# Patient Record
Sex: MALE | Race: WHITE
[De-identification: names, ages, dates, MRNs, and addresses within clinical notes are randomized per-mention and may not be internally consistent; named-entity substitution may affect disease eponyms.]

---

## 2017-01-30 ENCOUNTER — HOSPITAL ENCOUNTER (EMERGENCY)
Dept: HOSPITAL 47 - EC | Age: 57
Discharge: HOME | End: 2017-01-30
Payer: COMMERCIAL

## 2017-01-30 VITALS
SYSTOLIC BLOOD PRESSURE: 106 MMHG | HEART RATE: 72 BPM | TEMPERATURE: 99.2 F | RESPIRATION RATE: 16 BRPM | DIASTOLIC BLOOD PRESSURE: 69 MMHG

## 2017-01-30 DIAGNOSIS — R10.31: Primary | ICD-10-CM

## 2017-01-30 PROCEDURE — 99284 EMERGENCY DEPT VISIT MOD MDM: CPT

## 2017-01-30 NOTE — US
EXAMINATION TYPE: US groin extremity RT

 

DATE OF EXAM: 1/30/2017 6:14 PM

 

COMPARISON: No previous

 

CLINICAL HISTORY: Pain. Intermittent right groin pain x 2 weeks, gets worse when standing

 

TECHNOLOGIST IMPRESSION/RADIOGRAPHIC FINDINGS: Scanned right groin area of pain: 1.0 x 0.5 cm lymph n
ode seen containing a fatty hilum, otherwise appears wnl at this time, scanned left groin for compari
son: appears wnl at this time 

 

IMPRESSION:  Single nonenlarged right superficial inguinal lymph node containing a regular fatty hilu
m and no cortical thickening. No other sonographic abnormality.

## 2017-01-30 NOTE — ED
General Adult HPI





- General


Chief complaint: Urogenital


Stated complaint: rt groin pain


Time Seen by Provider: 01/30/17 17:09


Source: patient, RN notes reviewed


Mode of arrival: ambulatory


Limitations: no limitations





- History of Present Illness


Initial comments: 


This is a 48-year-old male presents with right-sided groin pain 2 weeks.  

Patient states he saw his regular physician for this pain and was treated with 

a steroid.  Patient states he's finished the steroid and the pain is still 

persistent.  Patient states the pain is worse when he is standing for multiple 

hours during the day at work.  Patient states the pain improves with sitting 

and resting.  Patient describes the pain as a burning in the right inguinal 

area.  Patient denies any swelling, erythema or abdominal pain.  Patient denies 

any nausea/vomiting/diarrhea, fever/chills.  Patient denies any heavy lifting 

at work.  Patient is not on any anticoagulants. Patient denies any recent fever

, chills, shortness breath, chest pain, back pain, numbness, tingling, hematuria

, headache, or visual changes, or any other complaints.








- Related Data


 Home Medications











 Medication  Instructions  Recorded  Confirmed


 


No Known Home Medications [No  01/30/17 01/30/17





Known Home Medications]   











 Allergies











Allergy/AdvReac Type Severity Reaction Status Date / Time


 


No Known Allergies Allergy   Verified 01/30/17 16:14














Review of Systems


ROS Statement: 


Those systems with pertinent positive or pertinent negative responses have been 

documented in the HPI.





ROS Other: All systems not noted in ROS Statement are negative.





Past Medical History


Past Medical History: Hyperlipidemia, Hypertension


Additional Past Medical History / Comment(s): HAIRLINE FRACTURES TO BILATERAL 

HIPS, SPINAL CORD PROBLEMS/ CHIP FX


History of Any Multi-Drug Resistant Organisms: None Reported


Past Surgical History: No Surgical Hx Reported


Past Psychological History: No Psychological Hx Reported


Smoking Status: Never smoker


Past Alcohol Use History: None Reported


Past Drug Use History: None Reported





General Exam





- General Exam Comments


Initial Comments: 


General:  The patient is awake and alert, in no distress, and does not appear 

acutely ill. 


Neck:  The neck is supple, there is no tenderness or JVD.  


Cardiovascular:  There is a regular rate and rhythm. No murmur, rub or gallop 

is appreciated.


Respiratory:  Lungs are clear to auscultation, respirations are non-labored, 

breath sounds are equal.  No wheezes, stridor, rales, or rhonchi.


Gastrointestinal:  Soft, non-distended, non-tender abdomen without masses or 

organomegaly noted. There is no rebound or guarding present.  No CVA 

tenderness. Bowel sounds are unremarkable.


Musculoskeletal:  Normal ROM, no tenderness.  Strength 5/5. Sensation intact. 

Radial pulses equal bilaterally 2+.  


Neurological:  A&O x 3. CN II-XII intact, There are no obvious motor or sensory 

deficits. Coordination appears grossly intact. Speech is normal.


Skin:  Skin is warm and dry and no rashes or lesions are noted. 


Psychiatric:  Cooperative, appropriate mood & affect, normal judgment.  





Limitations: no limitations


 exam: Present: normal inspection, other (Mild tenderness to palpation of the 

right inguinal area, no inguinal swelling or erythema. No hernia present on 

exam.).  Absent: testicular tenderness, urethral discharge, scrotal swelling





Course


 Vital Signs











  01/30/17 01/30/17





  16:08 19:21


 


Temperature 98.1 F 99.2 F


 


Pulse Rate 79 72


 


Respiratory 18 16





Rate  


 


Blood Pressure 126/89 106/69


 


O2 Sat by Pulse 98 96





Oximetry  














Medical Decision Making





- Medical Decision Making


This is a 56-year-old male who presents with right-sided groin pain.  On 

physical exam there is no evidence of a hernia, no inguinal swelling or 

erythema. Mild tenderness to palpation of the right inguinal area. No 

testicular pain. A right groin US was done and reviewed: Ultrasound showed 

lymph node approximately 1 cm but otherwise no abnormalities and no sign of 

hernia.  I discussed results with patient.  I discussed that this could be a 

muscle strain.  I discussed continue use of Tylenol and or Motrin.  I discussed 

that patient keep his follow-up appointment with his doctor tomorrow.  

Discussed ice and heat to the area.  I discussed return parameters. Discussed 

that patient should follow up with PCP in one to 2 days or return to the [facility] for 

any worsening symptoms or for any further concerns.  Patient was receptive to 

this plan and patient will be discharged home. 








Disposition


Clinical Impression: 


 Groin pain





Disposition: HOME SELF-CARE


Condition: Good


Instructions:  Groin Pain (ED)


Additional Instructions: 


Please continue Tylenol and/or Motrin if they've any pain.  Please rest ice and 

heat to the area.  Please continue follow-up with her primary care physician or 

return to the [facility] for any worsening symptoms or further concerns.


Referrals: 


Ghulam Esquivel MD [Primary Care Provider] - 1-2 days


Time of Disposition: 19:13

## 2019-07-30 ENCOUNTER — HOSPITAL ENCOUNTER (OUTPATIENT)
Dept: HOSPITAL 47 - RADCTMAIN | Age: 59
Discharge: HOME | End: 2019-07-30
Attending: FAMILY MEDICINE
Payer: COMMERCIAL

## 2019-07-30 DIAGNOSIS — S09.90XA: ICD-10-CM

## 2019-07-30 DIAGNOSIS — I67.82: Primary | ICD-10-CM

## 2019-07-30 DIAGNOSIS — Z91.018: ICD-10-CM

## 2019-07-30 DIAGNOSIS — R90.82: ICD-10-CM

## 2019-07-30 PROCEDURE — 70470 CT HEAD/BRAIN W/O & W/DYE: CPT

## 2019-07-30 NOTE — CT
EXAMINATION TYPE: CT brain wo/w con

 

DATE OF EXAM: 7/30/2019

 

COMPARISON: 9/5/2011

 

HISTORY: syncopal episode following head injury 3 days ago

 

CT DLP: 2108.4 mGycm

Automated exposure control for dose reduction was used.

 

CONTRAST: 

CT scan of the head is performed with IV Contrast, patient injected with 100 mL of Isovue 300.

 

FINDINGS: 

There is no abnormal enhancing mass or midline shift identified.  The ventricles and sulci are within
 normal limits in size. Low-attenuation the white matter is nonspecific but most typical remote micro
vascular ischemia. Intracranial atherosclerotic changes are noted.

 

IMPRESSION:

1. Nonspecific white matter changes most typical remote microvascular ischemia.

## 2020-02-01 ENCOUNTER — HOSPITAL ENCOUNTER (OUTPATIENT)
Dept: HOSPITAL 47 - LABWHC1 | Age: 60
Discharge: HOME | End: 2020-02-01
Attending: FAMILY MEDICINE
Payer: COMMERCIAL

## 2020-02-01 DIAGNOSIS — N18.3: Primary | ICD-10-CM

## 2020-02-01 PROCEDURE — 82575 CREATININE CLEARANCE TEST: CPT

## 2020-02-01 PROCEDURE — 81050 URINALYSIS VOLUME MEASURE: CPT

## 2020-02-01 PROCEDURE — 36415 COLL VENOUS BLD VENIPUNCTURE: CPT

## 2020-02-01 PROCEDURE — 84156 ASSAY OF PROTEIN URINE: CPT

## 2020-02-02 LAB
CREAT 24H UR-MCNC: 1736.4 MG/24HR (ref 1000–2000)
CREAT CL ?TM UR+SERPL-VRATE: 75 ML/MIN (ref 70–135)
SPECIMEN VOL 24H UR: 1200 MLS (ref 800–1800)

## 2020-02-03 ENCOUNTER — HOSPITAL ENCOUNTER (OUTPATIENT)
Dept: HOSPITAL 47 - RADUSWWP | Age: 60
Discharge: HOME | End: 2020-02-03
Attending: FAMILY MEDICINE
Payer: COMMERCIAL

## 2020-02-03 DIAGNOSIS — Z91.018: ICD-10-CM

## 2020-02-03 DIAGNOSIS — N18.3: Primary | ICD-10-CM

## 2020-02-03 LAB
PROT 24H UR-MRATE: 96 MG/24HR (ref 42–225)
SPECIMEN VOL 24H UR: 1200 MLS (ref 800–1800)

## 2020-02-03 PROCEDURE — 76770 US EXAM ABDO BACK WALL COMP: CPT

## 2020-02-03 NOTE — US
EXAMINATION TYPE: US kidneys/renal and bladder

 

DATE OF EXAM: 2/3/2020

 

COMPARISON: NONE

 

CLINICAL HISTORY: N18.3 Stage III Chronic kidney disease. CKD

 

EXAM MEASUREMENTS:

 

Right Kidney:  9.6 x 5.4 x 5.5 cm

Left Kidney: 10.2 x 5.4 x 5.6 cm

 

 

Right Kidney: No hydronephrosis or masses seen  

Left Kidney: No hydronephrosis or masses seen  

Bladder: Incompletely distended and suboptimally visualized.

**Bilateral Jets seen: only the left

 

 

There is slightly suboptimal cortical medullary differentiation. There is no evidence for hydronephro
sis at this point in time.  No nephrolithiasis is seen.  No masses are identified.  The urinary bladd
er is anechoic.  Bilateral ureteral jets are not seen.

 

 

 

IMPRESSION: Very subtly decreased cortical medullary differentiation, in keeping with this patient's 
history of chronic kidney disease. No hydronephrosis or nephrolithiasis.

## 2021-03-16 ENCOUNTER — HOSPITAL ENCOUNTER (INPATIENT)
Dept: HOSPITAL 47 - EC | Age: 61
LOS: 3 days | Discharge: HOME | DRG: 246 | End: 2021-03-19
Attending: INTERNAL MEDICINE | Admitting: INTERNAL MEDICINE
Payer: OTHER GOVERNMENT

## 2021-03-16 DIAGNOSIS — E78.5: ICD-10-CM

## 2021-03-16 DIAGNOSIS — K21.9: ICD-10-CM

## 2021-03-16 DIAGNOSIS — Z79.899: ICD-10-CM

## 2021-03-16 DIAGNOSIS — Z20.822: ICD-10-CM

## 2021-03-16 DIAGNOSIS — I25.10: ICD-10-CM

## 2021-03-16 DIAGNOSIS — E86.0: ICD-10-CM

## 2021-03-16 DIAGNOSIS — K25.9: ICD-10-CM

## 2021-03-16 DIAGNOSIS — I25.84: ICD-10-CM

## 2021-03-16 DIAGNOSIS — I10: ICD-10-CM

## 2021-03-16 DIAGNOSIS — D72.829: ICD-10-CM

## 2021-03-16 DIAGNOSIS — I21.4: Primary | ICD-10-CM

## 2021-03-16 DIAGNOSIS — R10.13: ICD-10-CM

## 2021-03-16 DIAGNOSIS — Z87.81: ICD-10-CM

## 2021-03-16 DIAGNOSIS — J98.11: ICD-10-CM

## 2021-03-16 DIAGNOSIS — R50.9: ICD-10-CM

## 2021-03-16 LAB
ALBUMIN SERPL-MCNC: 4.4 G/DL (ref 3.5–5)
ALP SERPL-CCNC: 60 U/L (ref 38–126)
ALT SERPL-CCNC: 15 U/L (ref 4–49)
AMYLASE SERPL-CCNC: 74 U/L (ref 30–110)
ANION GAP SERPL CALC-SCNC: 10 MMOL/L
AST SERPL-CCNC: 56 U/L (ref 17–59)
BASOPHILS # BLD AUTO: 0 K/UL (ref 0–0.2)
BASOPHILS NFR BLD AUTO: 0 %
BUN SERPL-SCNC: 19 MG/DL (ref 9–20)
CALCIUM SPEC-MCNC: 9.6 MG/DL (ref 8.4–10.2)
CHLORIDE SERPL-SCNC: 101 MMOL/L (ref 98–107)
CO2 SERPL-SCNC: 26 MMOL/L (ref 22–30)
EOSINOPHIL # BLD AUTO: 0.1 K/UL (ref 0–0.7)
EOSINOPHIL NFR BLD AUTO: 1 %
ERYTHROCYTE [DISTWIDTH] IN BLOOD BY AUTOMATED COUNT: 4.77 M/UL (ref 4.3–5.9)
ERYTHROCYTE [DISTWIDTH] IN BLOOD: 12 % (ref 11.5–15.5)
GLUCOSE SERPL-MCNC: 123 MG/DL (ref 74–99)
HCT VFR BLD AUTO: 42.7 % (ref 39–53)
HGB BLD-MCNC: 14.9 GM/DL (ref 13–17.5)
LIPASE SERPL-CCNC: 76 U/L (ref 23–300)
LYMPHOCYTES # SPEC AUTO: 1.4 K/UL (ref 1–4.8)
LYMPHOCYTES NFR SPEC AUTO: 11 %
MCH RBC QN AUTO: 31.3 PG (ref 25–35)
MCHC RBC AUTO-ENTMCNC: 35 G/DL (ref 31–37)
MCV RBC AUTO: 89.4 FL (ref 80–100)
MONOCYTES # BLD AUTO: 0.9 K/UL (ref 0–1)
MONOCYTES NFR BLD AUTO: 7 %
NEUTROPHILS # BLD AUTO: 10.7 K/UL (ref 1.3–7.7)
NEUTROPHILS NFR BLD AUTO: 81 %
PH UR: 7.5 [PH] (ref 5–8)
PLATELET # BLD AUTO: 216 K/UL (ref 150–450)
POTASSIUM SERPL-SCNC: 3.7 MMOL/L (ref 3.5–5.1)
PROT SERPL-MCNC: 6.8 G/DL (ref 6.3–8.2)
RBC UR QL: 6 /HPF (ref 0–5)
SODIUM SERPL-SCNC: 137 MMOL/L (ref 137–145)
SP GR UR: 1.02 (ref 1–1.03)
UROBILINOGEN UR QL STRIP: <2 MG/DL (ref ?–2)
WBC # BLD AUTO: 13.1 K/UL (ref 3.8–10.6)
WBC # UR AUTO: 1 /HPF (ref 0–5)

## 2021-03-16 PROCEDURE — 96375 TX/PRO/DX INJ NEW DRUG ADDON: CPT

## 2021-03-16 PROCEDURE — 85379 FIBRIN DEGRADATION QUANT: CPT

## 2021-03-16 PROCEDURE — 82150 ASSAY OF AMYLASE: CPT

## 2021-03-16 PROCEDURE — 80076 HEPATIC FUNCTION PANEL: CPT

## 2021-03-16 PROCEDURE — 87040 BLOOD CULTURE FOR BACTERIA: CPT

## 2021-03-16 PROCEDURE — 80061 LIPID PANEL: CPT

## 2021-03-16 PROCEDURE — 85025 COMPLETE CBC W/AUTO DIFF WBC: CPT

## 2021-03-16 PROCEDURE — 93458 L HRT ARTERY/VENTRICLE ANGIO: CPT

## 2021-03-16 PROCEDURE — 93306 TTE W/DOPPLER COMPLETE: CPT

## 2021-03-16 PROCEDURE — 99285 EMERGENCY DEPT VISIT HI MDM: CPT

## 2021-03-16 PROCEDURE — 71046 X-RAY EXAM CHEST 2 VIEWS: CPT

## 2021-03-16 PROCEDURE — 80048 BASIC METABOLIC PNL TOTAL CA: CPT

## 2021-03-16 PROCEDURE — 96365 THER/PROPH/DIAG IV INF INIT: CPT

## 2021-03-16 PROCEDURE — 83036 HEMOGLOBIN GLYCOSYLATED A1C: CPT

## 2021-03-16 PROCEDURE — 36415 COLL VENOUS BLD VENIPUNCTURE: CPT

## 2021-03-16 PROCEDURE — 87635 SARS-COV-2 COVID-19 AMP PRB: CPT

## 2021-03-16 PROCEDURE — 71250 CT THORAX DX C-: CPT

## 2021-03-16 PROCEDURE — 85652 RBC SED RATE AUTOMATED: CPT

## 2021-03-16 PROCEDURE — 80053 COMPREHEN METABOLIC PANEL: CPT

## 2021-03-16 PROCEDURE — 86140 C-REACTIVE PROTEIN: CPT

## 2021-03-16 PROCEDURE — 74177 CT ABD & PELVIS W/CONTRAST: CPT

## 2021-03-16 PROCEDURE — 81001 URINALYSIS AUTO W/SCOPE: CPT

## 2021-03-16 PROCEDURE — 78306 BONE IMAGING WHOLE BODY: CPT

## 2021-03-16 PROCEDURE — 93970 EXTREMITY STUDY: CPT

## 2021-03-16 PROCEDURE — 83690 ASSAY OF LIPASE: CPT

## 2021-03-16 PROCEDURE — 93005 ELECTROCARDIOGRAM TRACING: CPT

## 2021-03-16 PROCEDURE — 96366 THER/PROPH/DIAG IV INF ADDON: CPT

## 2021-03-16 PROCEDURE — 85610 PROTHROMBIN TIME: CPT

## 2021-03-16 PROCEDURE — 84484 ASSAY OF TROPONIN QUANT: CPT

## 2021-03-16 PROCEDURE — 83605 ASSAY OF LACTIC ACID: CPT

## 2021-03-16 PROCEDURE — 84145 PROCALCITONIN (PCT): CPT

## 2021-03-16 PROCEDURE — 85730 THROMBOPLASTIN TIME PARTIAL: CPT

## 2021-03-16 RX ADMIN — PIPERACILLIN AND TAZOBACTAM SCH MLS/HR: 3; .375 INJECTION, POWDER, FOR SOLUTION INTRAVENOUS at 20:52

## 2021-03-16 RX ADMIN — METOPROLOL TARTRATE SCH MG: 25 TABLET, FILM COATED ORAL at 15:29

## 2021-03-16 RX ADMIN — METOPROLOL TARTRATE SCH MG: 25 TABLET, FILM COATED ORAL at 20:46

## 2021-03-16 RX ADMIN — CEFAZOLIN SCH MLS/HR: 330 INJECTION, POWDER, FOR SOLUTION INTRAMUSCULAR; INTRAVENOUS at 16:25

## 2021-03-16 RX ADMIN — ATORVASTATIN CALCIUM SCH MG: 20 TABLET, FILM COATED ORAL at 15:30

## 2021-03-16 NOTE — ED
Abdominal Pain HPI





- General


Chief Complaint: Abdominal Pain


Stated Complaint: Abd Pain, Increased BP


Time Seen by Provider: 03/16/21 11:35


Source: patient, RN notes reviewed


Mode of arrival: ambulatory


Limitations: no limitations





- History of Present Illness


Initial Comments: 


60-year-old male presents emergency Department chief complaint of epigastric p

ain.  Patient states he has known gastric ulcers that there is drinking some 

diet pop, which irritated his ulcer.  Patient did have some nausea.  Patient 

states he had some chest discomfort yesterday but that has resolved he is 

symptom-free at this time.  He denies diarrhea constipation or dysuria no 

hematuria  or shortness breath this time.  Patient states that he felt his blood

pressure is slightly elevated.  Patient does take multiple medications for blood

pressure.  Patient offers no complaints.








- Related Data


                                Home Medications











 Medication  Instructions  Recorded  Confirmed


 


Alpha-D-Galactosidase [Beano] 300 unit PO AC-TID PRN 03/16/21 03/16/21


 


Cholecalciferol [Vitamin D3 (25 25 mcg PO DAILY 03/16/21 03/16/21





Mcg = 1000 Iu)]   


 


Omeprazole [PriLOSEC] 40 mg PO DAILY 03/16/21 03/16/21


 


Simvastatin [Zocor] 40 mg PO DAILY 03/16/21 03/16/21


 


amLODIPine [Norvasc] 10 mg PO DAILY 03/16/21 03/16/21


 


lisinopriL 40 mg PO DAILY 03/16/21 03/16/21











                                    Allergies











Allergy/AdvReac Type Severity Reaction Status Date / Time


 


No Known Allergies Allergy   Verified 03/16/21 12:30














Review of Systems


ROS Statement: 


Those systems with pertinent positive or pertinent negative responses have been 

documented in the HPI.





ROS Other: All systems not noted in ROS Statement are negative.





Past Medical History


Past Medical History: GERD/Reflux, Hyperlipidemia, Hypertension


Additional Past Medical History / Comment(s): HAIRLINE FRACTURES TO BILATERAL 

HIPS, SPINAL CORD PROBLEMS/ CHIP FX


History of Any Multi-Drug Resistant Organisms: None Reported


Past Surgical History: No Surgical Hx Reported


Additional Past Surgical History / Comment(s): hemorroid


Past Psychological History: No Psychological Hx Reported


Smoking Status: Never smoker


Past Alcohol Use History: None Reported


Past Drug Use History: None Reported





General Exam


Limitations: no limitations


General appearance: alert, in no apparent distress


Head exam: Present: atraumatic, normocephalic, normal inspection


Eye exam: Present: normal appearance, PERRL, EOMI.  Absent: scleral icterus, 

conjunctival injection, periorbital swelling


ENT exam: Present: normal exam, normal oropharynx, mucous membranes moist


Neck exam: Present: normal inspection, full ROM.  Absent: tenderness, 

meningismus, lymphadenopathy


Respiratory exam: Present: normal lung sounds bilaterally.  Absent: respiratory 

distress, wheezes, rales, rhonchi, stridor


Cardiovascular Exam: Present: regular rate, normal rhythm, normal heart sounds. 

 Absent: systolic murmur, diastolic murmur, rubs, gallop, clicks


GI/Abdominal exam: Present: soft, tenderness (Mild epigastric), normal bowel 

sounds.  Absent: distended, guarding, rebound, rigid


Back exam: Absent: CVA tenderness (R), CVA tenderness (L)


Neurological exam: Present: alert


Skin exam: Present: warm, dry, intact, normal color.  Absent: rash





Course


                                   Vital Signs











  03/16/21





  11:25


 


Temperature 98.5 F


 


Pulse Rate 101 H


 


Respiratory 20





Rate 


 


Blood Pressure 143/100


 


O2 Sat by Pulse 97





Oximetry 














Medical Decision Making





- Medical Decision Making





Patient updated on results.  Patient remains chest pain-free does admit to mild 

epigastric pain which is improving.  Patient's troponin is elevated 6.8.  

Patient has a history of hypertension hyperlipidemia patient had chest pain 

yesterday.  Patient will be admitted for NSTEMI, patient was started on heparin.





- Lab Data


Result diagrams: 


                                 03/16/21 11:56





                                 03/16/21 11:56


                                   Lab Results











  03/16/21 03/16/21 03/16/21 Range/Units





  11:56 11:56 11:56 


 


WBC  13.1 H    (3.8-10.6)  k/uL


 


RBC  4.77    (4.30-5.90)  m/uL


 


Hgb  14.9    (13.0-17.5)  gm/dL


 


Hct  42.7    (39.0-53.0)  %


 


MCV  89.4    (80.0-100.0)  fL


 


MCH  31.3    (25.0-35.0)  pg


 


MCHC  35.0    (31.0-37.0)  g/dL


 


RDW  12.0    (11.5-15.5)  %


 


Plt Count  216    (150-450)  k/uL


 


MPV  6.7    


 


Neutrophils %  81    %


 


Lymphocytes %  11    %


 


Monocytes %  7    %


 


Eosinophils %  1    %


 


Basophils %  0    %


 


Neutrophils #  10.7 H    (1.3-7.7)  k/uL


 


Lymphocytes #  1.4    (1.0-4.8)  k/uL


 


Monocytes #  0.9    (0-1.0)  k/uL


 


Eosinophils #  0.1    (0-0.7)  k/uL


 


Basophils #  0.0    (0-0.2)  k/uL


 


Sodium   137   (137-145)  mmol/L


 


Potassium   3.7   (3.5-5.1)  mmol/L


 


Chloride   101   ()  mmol/L


 


Carbon Dioxide   26   (22-30)  mmol/L


 


Anion Gap   10   mmol/L


 


BUN   19   (9-20)  mg/dL


 


Creatinine   1.25   (0.66-1.25)  mg/dL


 


Est GFR (CKD-EPI)AfAm   71   (>60 ml/min/1.73 sqM)  


 


Est GFR (CKD-EPI)NonAf   62   (>60 ml/min/1.73 sqM)  


 


Glucose   123 H   (74-99)  mg/dL


 


Plasma Lactic Acid Je    1.1  (0.7-2.0)  mmol/L


 


Calcium   9.6   (8.4-10.2)  mg/dL


 


Total Bilirubin   0.9   (0.2-1.3)  mg/dL


 


AST   56   (17-59)  U/L


 


ALT   15   (4-49)  U/L


 


Alkaline Phosphatase   60   ()  U/L


 


Troponin I     (0.000-0.034)  ng/mL


 


Total Protein   6.8   (6.3-8.2)  g/dL


 


Albumin   4.4   (3.5-5.0)  g/dL


 


Amylase   74   ()  U/L


 


Lipase   76   ()  U/L


 


Urine Color     


 


Urine Appearance     (Clear)  


 


Urine pH     (5.0-8.0)  


 


Ur Specific Gravity     (1.001-1.035)  


 


Urine Protein     (Negative)  


 


Urine Glucose (UA)     (Negative)  


 


Urine Ketones     (Negative)  


 


Urine Blood     (Negative)  


 


Urine Nitrite     (Negative)  


 


Urine Bilirubin     (Negative)  


 


Urine Urobilinogen     (<2.0)  mg/dL


 


Ur Leukocyte Esterase     (Negative)  


 


Urine RBC     (0-5)  /hpf


 


Urine WBC     (0-5)  /hpf


 


Urine Mucus     (None)  /hpf














  03/16/21 03/16/21 Range/Units





  11:56 11:58 


 


WBC    (3.8-10.6)  k/uL


 


RBC    (4.30-5.90)  m/uL


 


Hgb    (13.0-17.5)  gm/dL


 


Hct    (39.0-53.0)  %


 


MCV    (80.0-100.0)  fL


 


MCH    (25.0-35.0)  pg


 


MCHC    (31.0-37.0)  g/dL


 


RDW    (11.5-15.5)  %


 


Plt Count    (150-450)  k/uL


 


MPV    


 


Neutrophils %    %


 


Lymphocytes %    %


 


Monocytes %    %


 


Eosinophils %    %


 


Basophils %    %


 


Neutrophils #    (1.3-7.7)  k/uL


 


Lymphocytes #    (1.0-4.8)  k/uL


 


Monocytes #    (0-1.0)  k/uL


 


Eosinophils #    (0-0.7)  k/uL


 


Basophils #    (0-0.2)  k/uL


 


Sodium    (137-145)  mmol/L


 


Potassium    (3.5-5.1)  mmol/L


 


Chloride    ()  mmol/L


 


Carbon Dioxide    (22-30)  mmol/L


 


Anion Gap    mmol/L


 


BUN    (9-20)  mg/dL


 


Creatinine    (0.66-1.25)  mg/dL


 


Est GFR (CKD-EPI)AfAm    (>60 ml/min/1.73 sqM)  


 


Est GFR (CKD-EPI)NonAf    (>60 ml/min/1.73 sqM)  


 


Glucose    (74-99)  mg/dL


 


Plasma Lactic Acid Je    (0.7-2.0)  mmol/L


 


Calcium    (8.4-10.2)  mg/dL


 


Total Bilirubin    (0.2-1.3)  mg/dL


 


AST    (17-59)  U/L


 


ALT    (4-49)  U/L


 


Alkaline Phosphatase    ()  U/L


 


Troponin I  6.880 H*   (0.000-0.034)  ng/mL


 


Total Protein    (6.3-8.2)  g/dL


 


Albumin    (3.5-5.0)  g/dL


 


Amylase    ()  U/L


 


Lipase    ()  U/L


 


Urine Color   Yellow  


 


Urine Appearance   Clear  (Clear)  


 


Urine pH   7.5  (5.0-8.0)  


 


Ur Specific Gravity   1.016  (1.001-1.035)  


 


Urine Protein   Trace H  (Negative)  


 


Urine Glucose (UA)   Negative  (Negative)  


 


Urine Ketones   Trace H  (Negative)  


 


Urine Blood   Small H  (Negative)  


 


Urine Nitrite   Negative  (Negative)  


 


Urine Bilirubin   Negative  (Negative)  


 


Urine Urobilinogen   <2.0  (<2.0)  mg/dL


 


Ur Leukocyte Esterase   Negative  (Negative)  


 


Urine RBC   6 H  (0-5)  /hpf


 


Urine WBC   1  (0-5)  /hpf


 


Urine Mucus   Rare H  (None)  /hpf














Critical Care Time


Critical Care Time: Yes


Total Critical Care Time: 35


Critical Care Time: 


Total 35 minutes of critical care time were used initially evaluated the 

patient, reviewed past medical history or labs and EKG.  Patient found have a 

troponin 6.8.  Patient is symptom-free from chest pain chest pain started 

yesterday.  Patient was started on heparin low-dose discussed with cardiology, 

admitting physician.








Disposition


Clinical Impression: 


 NSTEMI (non-ST elevated myocardial infarction)





Disposition: ADMITTED AS IP TO THIS HOSP


Condition: Fair


Referrals: 


[facility] [Primary Care Provider] - 1-2 days

## 2021-03-16 NOTE — P.HPIM
History of Present Illness





This is a pleasant 60 years old male with past medical history of hypertension, 

hyperlipidemia, gastroesophageal reflux disease.  He follows up at the [facility].  He presents because of upper abdominal pain and tenderness of 2 days' 

duration that wake him up yesterday, it is across the upper abdomen and lower 

chest but his tenderness mainly in the epigastric.  He tried several 

over-the-counter medication like this mass, and antiacids with no help, enter on

each right sodium bicarb and baking soda which helped a little bit.  He 

describes the pain as burning, 3/10 in severity associated with nausea and 

vomiting twice.


No other chest pain or dyspnea.  No coughing.  Both complains from some cramping

in the both legs


He denies smoking, alcohol or illicit drug.





On admission he developed fever of 101, blood pressure is 1:30/93.  Restoril 

vitals are stable and he is saturating 98% on room air


He had mild leukocytosis of 13.1 K, BMP and liver enzymes are unremarkable but 

troponin is elevated 6.8 and 7.2, Lipase is normal at 76


Urine analysis looks dehydrated sample but not very suggestive of infection.  

Corona virus not detected


Patient had CT of the abdomen and pelvis with IV contrast which was unremarkable


CT of the chest without contrast showing mild atelectasis, no consolidation, no 

suspicious pulmonary mass, no evidence of bronchopneumonia


EKG showing normal sinus rhythm at 76 with poor R-wave progression in V1 to V2, 

T-wave inversion in V1 to V6.  QTC is 454


in emergency room cardiologist team evaluated the patient and they were planning

for cardiac cath today however patient developed significant fever of 101 so 

cardiac cath was held and patient was placed on heparin drip


























Review of Systems





CONSTITUTIONAL: No fever, no malaise, no fatigue. 


HEENT: No recent visual problems or hearing problems. Denied any sore throat. 


CARDIOVASCULAR: No  orthopnea, PND, no palpitations, no syncope. 


PULMONARY: No shortness of breath, no cough, no hemoptysis. 


GASTROINTESTINAL: No diarrhea. Normoactive bowel sounds. 


NEUROLOGICAL: No headaches, no weakness, no numbness. 


HEMATOLOGICAL: Denies any bleeding or petechiae. 


GENITOURINARY: Denies any burning micturition, frequency, or urgency. 


MUSCULOSKELETAL/RHEUMATOLOGICAL: Denies any joint pain, swelling, or any muscle 

pain. 


ENDOCRINE: Denies any polyuria or polydipsia.











Past Medical History


Past Medical History: GERD/Reflux, Hyperlipidemia, Hypertension


Additional Past Medical History / Comment(s): HAIRLINE FRACTURES TO BILATERAL 

HIPS, SPINAL CORD PROBLEMS/ CHIP FX


History of Any Multi-Drug Resistant Organisms: None Reported


Past Surgical History: No Surgical Hx Reported


Additional Past Surgical History / Comment(s): hemorroid


Past Psychological History: No Psychological Hx Reported


Smoking Status: Never smoker


Past Alcohol Use History: None Reported


Past Drug Use History: None Reported





Medications and Allergies


                                Home Medications











 Medication  Instructions  Recorded  Confirmed  Type


 


Alpha-D-Galactosidase [Beano] 300 unit PO AC-TID PRN 03/16/21 03/16/21 History


 


Cholecalciferol [Vitamin D3 (25 25 mcg PO DAILY 03/16/21 03/16/21 History





Mcg = 1000 Iu)]    


 


Omeprazole [PriLOSEC] 40 mg PO DAILY 03/16/21 03/16/21 History


 


Simvastatin [Zocor] 40 mg PO DAILY 03/16/21 03/16/21 History


 


amLODIPine [Norvasc] 10 mg PO DAILY 03/16/21 03/16/21 History


 


lisinopriL 40 mg PO DAILY 03/16/21 03/16/21 History








                                    Allergies











Allergy/AdvReac Type Severity Reaction Status Date / Time


 


cinnamon Allergy  Rash/Hives Verified 03/16/21 13:39














Physical Exam


Vitals: 


                                   Vital Signs











  Temp Pulse Resp BP Pulse Ox


 


 03/16/21 17:27  98.6 F  63  18  130/93  98


 


 03/16/21 16:28  98.4 F  70  18  130/93  97


 


 03/16/21 15:32  99.1 F  73  16  138/92  97


 


 03/16/21 14:22  101 F H  79  18  145/91  97


 


 03/16/21 11:25  98.5 F  101 H  20  143/100  97








                                Intake and Output











 03/16/21 03/16/21 03/16/21





 06:59 14:59 22:59


 


Other:   


 


  Weight  71.214 kg 














GENERAL: The patient is alert and oriented x3, not in any acute distress. Well 

developed, well nourished. 


HEENT: Pupils are round and equally reacting to light. EOMI. No scleral icterus.

 No conjunctival pallor. Normocephalic, atraumatic. No pharyngeal erythema. No 

thyromegaly. 


CARDIOVASCULAR: S1 and S2 present. No murmurs, rubs, or gallops. 


PULMONARY: Chest is clear to auscultation, no wheezing or crackles. 


-ABDOMEN: Soft, mild epigastric tenderness, no rebound tenderness, no guarding, 

abdomen is soft, nondistended, normoactive bowel sounds. No palpable 

organomegaly. 


MUSCULOSKELETAL: No joint swelling or deformity. 


EXTREMITIES: No cyanosis, clubbing, or pedal edema. 


NEUROLOGICAL: Gross neurological examination did not reveal any focal deficits. 


SKIN: No rashes. No petechiae








Results


CBC & Chem 7: 


                                 03/16/21 11:56





                                 03/16/21 11:56


Labs: 


                  Abnormal Lab Results - Last 24 Hours (Table)











  03/16/21 03/16/21 03/16/21 Range/Units





  11:56 11:56 11:56 


 


WBC  13.1 H    (3.8-10.6)  k/uL


 


Neutrophils #  10.7 H    (1.3-7.7)  k/uL


 


Glucose   123 H   (74-99)  mg/dL


 


Troponin I    6.880 H*  (0.000-0.034)  ng/mL


 


Urine Protein     (Negative)  


 


Urine Ketones     (Negative)  


 


Urine Blood     (Negative)  


 


Urine RBC     (0-5)  /hpf


 


Urine Mucus     (None)  /hpf














  03/16/21 03/16/21 Range/Units





  11:58 15:51 


 


WBC    (3.8-10.6)  k/uL


 


Neutrophils #    (1.3-7.7)  k/uL


 


Glucose    (74-99)  mg/dL


 


Troponin I   7.250 H*  (0.000-0.034)  ng/mL


 


Urine Protein  Trace H   (Negative)  


 


Urine Ketones  Trace H   (Negative)  


 


Urine Blood  Small H   (Negative)  


 


Urine RBC  6 H   (0-5)  /hpf


 


Urine Mucus  Rare H   (None)  /hpf














Assessment and Plan


Assessment: 





Elevated troponin with epigastric pain concerning for non-STEMI


Possible sepsis with leukocytosis and fever.  Unknown source.  Corona Virus is 

negative


Hypertension


Hyperlipidemia


History of GERD








Plan: 





This is a pleasant 60 years old male who presents with non-STEMI and sepsis of 

unknown origin.  Continue with aspirin and heparin drip and antihypertensive 

medication per Cardiologist recommendation.  Check echocardiogram.


Check a blood culture, C-reactive protein, protcalcitonin.  Start the patient 

empirically on Zosyn.  Continue with gentle hydration.


Labs and medication were reviewed..  Continue same treatment.  Continue with 

symptomatic treatment.  Resume home medication.  Monitor lytes and vitals.  DVT 

and GI prophylaxis.  Further recommendations depends on the clinical course of 

the patient


DVT prophylaxis:  heparin


GI Prophylaxis: Ppi


Prognosis is guarded

## 2021-03-16 NOTE — XR
EXAMINATION TYPE: XR chest 2V

 

DATE OF EXAM: 3/16/2021

 

COMPARISON: None

 

INDICATION: Pain

 

TECHNIQUE:  Frontal and lateral views of the chest are obtained.

 

FINDINGS:  

The heart size is normal.  

The pulmonary vasculature is normal.

The lungs are clear.

 

IMPRESSION:  

1. No acute pulmonary process.

## 2021-03-16 NOTE — PN
PROGRESS NOTE



Mr. Larose was seen and evaluated by my nurse practitioner, Mar Srinivasan.  Please

refer to that note.  This gentleman came in with nondescript chest and more so

epigastric pain.  He took some Pepcid and he complains of mid epigastric discomfort.

There is some tenderness in the epigastric area.  Also he has elevated white count and

fever of 101.  An abdominal CT was performed.  Results are pending.  He received some

contrast for this.  He is completely free of chest pain.  Two sets of troponins are

available. One is 6.8.  The other is 7.2. EKG revealed precordial ST-T changes.  He is

in a sinus mechanism.  He is hemodynamically stable. Physical exam is unremarkable.

Mild epigastric tenderness.  Normal bowel sounds.



I am recommending that I will do coronary angiography and PCI at 6:45 a.m. tomorrow,

but if he has any pain or discomfort, I will do him in the night.  He is on a heparin

drip, on a beta blocker and aspirin.  I explained to him the rationale, risks,

benefits, options. He understands all details and wishes to proceed with the procedure.

If he has any symptoms before then, I will do the procedure emergently, and patient is

aware of this.  In the meantime, I will check the CT scan results as well.  He has

received some contrast for this.  We will hydrate him through the night.





MMSTEVEL / IJN: [id number] / Job#: [id number]

## 2021-03-16 NOTE — CT
EXAMINATION TYPE: CT abdomen pelvis w con

 

DATE OF EXAM: 3/16/2021

 

COMPARISON: None

 

HISTORY: Fever, chest and abdominal pain

 

CT DLP: 751.1 mGycm

Automated exposure control for dose reduction was used.

 

CONTRAST: 

Performed with IV Contrast, patient injected with 100 mL of Isovue 300.

 

Images obtained from the diaphragm to the floor the pelvis with oral and IV contrast.

 

There is minimal subsegmental atelectasis right lung base. Heart size is normal. There is no pericard
ial effusion.

 

Liver spleen pancreas stomach appear intact. The bile ducts are not dilated. The gallbladder appears 
normal.

 

There is no adrenal mass. Kidneys show satisfactory contrast opacification. There is no hydronephrosi
s. Ureters are not dilated. There is no retroperitoneal adenopathy. Delayed images show normal renal 
excretion. Bladder distends smoothly. There is no inguinal hernia. There is no free fluid in the pelv
is. There is calcification of the vas deferens. This is associated with diabetes. There is no evidenc
e of a pelvic mass.

 

There is no mesenteric edema. There is no ascites or free air. There is no sign of a bowel obstructio
n. There is no sign of thickened appendix. Appendix not definitely seen.

 

There is a minimal L4-5 subluxation of 5 mm. There is no spondylolysis. There is no lumbar compressio
n fracture. The bony pelvis is intact. The hip joints appear intact.

 

IMPRESSION:

No acute abnormality of the abdomen pelvis. Atherosclerotic vascular disease. Degenerative first-degr
ee L4-5 spondylolisthesis. Appendix not seen.
EXAMINATION TYPE: CT chest wo con

 

DATE OF EXAM: 3/16/2021

 

COMPARISON: None

 

HISTORY: Fever, chest and abdominal pain

 

CT DLP: 272.4 mGycm

Automated exposure control for dose reduction was used.

 

Images obtained from the thoracic inlet to the diaphragm without contrast.

 

There is mild subsegmental atelectasis right lung base. The lungs are clear of infiltrate. There is n
o evidence of a pulmonary mass. Heart size is normal. There is no pericardial effusion. There is no p
leural effusion. The thoracic spine is intact. There is no compression fracture. Sternum is intact.

 

There is no mediastinal adenopathy. There are no hilar masses. There is coronary artery calcification
. Upper abdominal soft tissues are intact.

 

IMPRESSION:

Minimal subsegmental atelectasis right lung base. No suspicious pulmonary mass. No evidence of bronch
opneumonia.
Calm

## 2021-03-16 NOTE — P.CRDCN
History of Present Illness


Consult date: 03/16/21


History of present illness: 





HISTORY OF PRESENT ILLNESS:  





This is a 60-year-old male with a past medical history significant for 

hypertension and hyperlipidemia. Patient does not follow with a cardiologist. We

have been asked to see the patient in consultation for abnormal troponins. 

Patient examined at the bedside in the emergency room.  Patient states a few 

days ago he went to the store and got a 44 ounce Pepsi in the evening and then 

went to bed. He reports epigastric discomfort in the following morning which he 

attributed to drinking a large Pepsi the night before. He reports some mild 

discomfort across his upper left and right abdominal quadrants as well.  He 

reports taking multiple over-the-counter medications including Tums and sodium 

bicarb without much relief.  He also reports intermittent left-sided chest 

discomfort since yesterday afternoon.  He states yesterday afternoon he decided 

to go for a walk to see if it would help the discomfort.  He states he became 

nauseated and had an episode of emesis.  He denies shortness of breath.  Denies 

cough or congestion.  Patient reports over the last 2 days his blood pressure 

has been elevated with a systolic in the 150s.  Patient states his blood 

pressure usually runs with a systolic in the 120s.





EKG reveals sinus mechanism with T-wave inversions in V3 through V5


Chest xray negative for acute process


Laboratory data: WBC 13.1.  Hemoglobin 14.9.  Platelet count 216.  Sodium 137.  

Potassium 3.7.  BUN 19.  Creatinine 1.25.  Troponin 6.880.


Current home cardiac medications include amlodipine 10 mg daily, simvastatin 40 

mg daily, and lisinopril 41 g daily





REVIEW OF SYSTEMS: 


At the time of my exam:


CONSTITUTIONAL: Denies fever or chills.


HEENT: Denies blurred vision, vision changes, or eye pain. Denies hemoptysis 


CARDIOVASCULAR: Denies chest pain. Denies orthopnea. Denies PND. Denies 

palpitations


RESPIRATORY: Denies shortness of breath. 


GASTROINTESTINAL: Denies abdominal pain. Denies nausea or vomiting. 


HEMATOLOGIC: Denies bleeding disorders.


GENITOURINARY:  Denies any blood in urine.


SKIN: Denies pruitis. Denies rash.





PHYSICAL EXAM: 


VITAL SIGNS: Reviewed.


GENERAL: Well-developed in no acute distress. 


HEENT: Head is normocephalic. Pupils are equal, round. Sclerae anicteric. Mucous

membranes of the mouth are moist. Neck supple. No JVD or thyromegaly


LUNGS: Respirations even and unlabored. Lungs essentially clear to auscultation 

bilaterally.


HEART: Regular rate and rhythm.  S1 and S2 heard.  Soft systolic murmur noted.


ABDOMEN: Soft. Nondistended. Nontender.


EXTREMITIES: Normal range of motion.  No clubbing or cyanosis.  Peripheral 

pulses intact.  No lower extremity edema


NEUROLOGIC: Awake and alert. Oriented x 3. 





ASSESSMENT: 


Epigastric pain


Non-ST elevated myocardial infarction


Hypertension


Hyperlipidemia


Fever with leukocytosis





PLAN: 


Obtain 2-D echo to assess cardiac structure and function


Continue IV heparin


Resume simvastatin and lisinopril.  Hold Norvasc


Begin aspirin 81 mg daily and metoprolol 25 mg twice a day


Originally, plan was for cardiac cath this afternoon or tomorrow. However, 

patient is febrile with a fever of 101F.  He also had leukocytosis on 

admission.  We will hold off on cardiac catheterization at this time.


Covid test pending


Obtain CT abdomen pelvis with IV and oral contrast


Further recommendations pending patient course





Nurse practitioner note has been reviewed by physician. Signing provider agrees 

with the documented findings, assessment, and plan of care. 








Past Medical History


Past Medical History: GERD/Reflux, Hyperlipidemia, Hypertension


Additional Past Medical History / Comment(s): HAIRLINE FRACTURES TO BILATERAL 

HIPS, SPINAL CORD PROBLEMS/ CHIP FX


History of Any Multi-Drug Resistant Organisms: None Reported


Past Surgical History: No Surgical Hx Reported


Additional Past Surgical History / Comment(s): hemorroid


Past Psychological History: No Psychological Hx Reported


Smoking Status: Never smoker


Past Alcohol Use History: None Reported


Past Drug Use History: None Reported





Medications and Allergies


                                Home Medications











 Medication  Instructions  Recorded  Confirmed  Type


 


Alpha-D-Galactosidase [Beano] 300 unit PO AC-TID PRN 03/16/21 03/16/21 History


 


Cholecalciferol [Vitamin D3 (25 25 mcg PO DAILY 03/16/21 03/16/21 History





Mcg = 1000 Iu)]    


 


Omeprazole [PriLOSEC] 40 mg PO DAILY 03/16/21 03/16/21 History


 


Simvastatin [Zocor] 40 mg PO DAILY 03/16/21 03/16/21 History


 


amLODIPine [Norvasc] 10 mg PO DAILY 03/16/21 03/16/21 History


 


lisinopriL 40 mg PO DAILY 03/16/21 03/16/21 History








                                    Allergies











Allergy/AdvReac Type Severity Reaction Status Date / Time


 


cinnamon Allergy  Rash/Hives Verified 03/16/21 13:39














Physical Exam


Vitals: 


                                   Vital Signs











  Temp Pulse Resp BP Pulse Ox


 


 03/16/21 14:22  101 F H  79  18  145/91  97


 


 03/16/21 11:25  98.5 F  101 H  20  143/100  97








                                Intake and Output











 03/15/21 03/16/21 03/16/21





 22:59 06:59 14:59


 


Other:   


 


  Weight   71.214 kg














Results





                                 03/16/21 11:56





                                 03/16/21 11:56


                                 Cardiac Enzymes











  03/16/21 03/16/21 Range/Units





  11:56 11:56 


 


AST  56   (17-59)  U/L


 


Troponin I   6.880 H*  (0.000-0.034)  ng/mL








                                       CBC











  03/16/21 Range/Units





  11:56 


 


WBC  13.1 H  (3.8-10.6)  k/uL


 


RBC  4.77  (4.30-5.90)  m/uL


 


Hgb  14.9  (13.0-17.5)  gm/dL


 


Hct  42.7  (39.0-53.0)  %


 


Plt Count  216  (150-450)  k/uL








                          Comprehensive Metabolic Panel











  03/16/21 Range/Units





  11:56 


 


Sodium  137  (137-145)  mmol/L


 


Potassium  3.7  (3.5-5.1)  mmol/L


 


Chloride  101  ()  mmol/L


 


Carbon Dioxide  26  (22-30)  mmol/L


 


BUN  19  (9-20)  mg/dL


 


Creatinine  1.25  (0.66-1.25)  mg/dL


 


Glucose  123 H  (74-99)  mg/dL


 


Calcium  9.6  (8.4-10.2)  mg/dL


 


AST  56  (17-59)  U/L


 


ALT  15  (4-49)  U/L


 


Alkaline Phosphatase  60  ()  U/L


 


Total Protein  6.8  (6.3-8.2)  g/dL


 


Albumin  4.4  (3.5-5.0)  g/dL








                               Current Medications











Generic Name Dose Route Start Last Admin





  Trade Name Freq  PRN Reason Stop Dose Admin


 


Aspirin  325 mg  03/17/21 09:00 





  Aspirin 325 Mg Tab  PO  





  DAILY MARIANA  


 


Heparin Sodium (Porcine)  0 unit  03/16/21 13:19 





  Heparin Sodium,Porcine 5,000 Unit/Ml 1 Ml Vial  IV  





  PER PROTOCOL PRN  





  Low PTT  





  Protocol  


 


Heparin Sodium/Sodium Chloride  250 mls @ 8.546 mls/hr  03/16/21 13:30  03/16/21

 14:02





  25,000 unit/ Sodium Chloride  IV   12 units/kg/hr





  .Q24H MARIANA   8.546 mls/hr





    Administration





  Protocol  





  12 UNITS/KG/HR  


 


Nitroglycerin  0.4 mg  03/16/21 13:24 





  Nitroglycerin Sl Tabs 0.4 Mg Tab  SUBLINGUAL  





  Q5M PRN  





  Chest Pain  








                                Intake and Output











 03/15/21 03/16/21 03/16/21





 22:59 06:59 14:59


 


Other:   


 


  Weight   71.214 kg








                                 Patient Weight











 03/17/21





 06:59


 


Weight 71.214 kg








                                        





                                 03/16/21 11:56 





                                 03/16/21 11:56

## 2021-03-17 LAB
ALBUMIN SERPL-MCNC: 3.6 G/DL (ref 3.5–5)
ALP SERPL-CCNC: 52 U/L (ref 38–126)
ALT SERPL-CCNC: 13 U/L (ref 4–49)
ANION GAP SERPL CALC-SCNC: 4 MMOL/L
APTT BLD: 45.1 SEC (ref 22–30)
AST SERPL-CCNC: 46 U/L (ref 17–59)
BASOPHILS # BLD AUTO: 0 K/UL (ref 0–0.2)
BASOPHILS NFR BLD AUTO: 0 %
BILIRUB INDIRECT SERPL-MCNC: 0.9 MG/DL (ref 0–1.1)
BILIRUBIN DIRECT+TOT PNL SERPL-MCNC: 0 MG/DL (ref 0–0.2)
BUN SERPL-SCNC: 17 MG/DL (ref 9–20)
CALCIUM SPEC-MCNC: 8.9 MG/DL (ref 8.4–10.2)
CHLORIDE SERPL-SCNC: 108 MMOL/L (ref 98–107)
CHOLEST SERPL-MCNC: 139 MG/DL (ref ?–200)
CO2 SERPL-SCNC: 26 MMOL/L (ref 22–30)
EOSINOPHIL # BLD AUTO: 0.1 K/UL (ref 0–0.7)
EOSINOPHIL NFR BLD AUTO: 1 %
ERYTHROCYTE [DISTWIDTH] IN BLOOD BY AUTOMATED COUNT: 4.39 M/UL (ref 4.3–5.9)
ERYTHROCYTE [DISTWIDTH] IN BLOOD: 12 % (ref 11.5–15.5)
GLUCOSE SERPL-MCNC: 112 MG/DL (ref 74–99)
HBA1C MFR BLD: 5.4 % (ref 4–6)
HCT VFR BLD AUTO: 40.1 % (ref 39–53)
HDLC SERPL-MCNC: 42 MG/DL (ref 40–60)
HGB BLD-MCNC: 13.7 GM/DL (ref 13–17.5)
INR PPP: 1 (ref ?–1.2)
LDLC SERPL CALC-MCNC: 82 MG/DL (ref 0–99)
LYMPHOCYTES # SPEC AUTO: 2.1 K/UL (ref 1–4.8)
LYMPHOCYTES NFR SPEC AUTO: 19 %
MCH RBC QN AUTO: 31.2 PG (ref 25–35)
MCHC RBC AUTO-ENTMCNC: 34.1 G/DL (ref 31–37)
MCV RBC AUTO: 91.2 FL (ref 80–100)
MONOCYTES # BLD AUTO: 0.8 K/UL (ref 0–1)
MONOCYTES NFR BLD AUTO: 8 %
NEUTROPHILS # BLD AUTO: 7.6 K/UL (ref 1.3–7.7)
NEUTROPHILS NFR BLD AUTO: 71 %
PLATELET # BLD AUTO: 191 K/UL (ref 150–450)
POTASSIUM SERPL-SCNC: 3.9 MMOL/L (ref 3.5–5.1)
PROT SERPL-MCNC: 6 G/DL (ref 6.3–8.2)
PT BLD: 10.4 SEC (ref 9–12)
SODIUM SERPL-SCNC: 138 MMOL/L (ref 137–145)
TRIGL SERPL-MCNC: 75 MG/DL (ref ?–150)
WBC # BLD AUTO: 10.8 K/UL (ref 3.8–10.6)

## 2021-03-17 PROCEDURE — 4A023N7 MEASUREMENT OF CARDIAC SAMPLING AND PRESSURE, LEFT HEART, PERCUTANEOUS APPROACH: ICD-10-PCS

## 2021-03-17 PROCEDURE — 027137Z DILATION OF CORONARY ARTERY, TWO ARTERIES WITH FOUR OR MORE DRUG-ELUTING INTRALUMINAL DEVICES, PERCUTANEOUS APPROACH: ICD-10-PCS

## 2021-03-17 PROCEDURE — B2111ZZ FLUOROSCOPY OF MULTIPLE CORONARY ARTERIES USING LOW OSMOLAR CONTRAST: ICD-10-PCS

## 2021-03-17 RX ADMIN — METOPROLOL TARTRATE SCH MG: 25 TABLET, FILM COATED ORAL at 20:09

## 2021-03-17 RX ADMIN — NITROGLYCERIN ONE MCG: 10 INJECTION INTRAVENOUS at 07:46

## 2021-03-17 RX ADMIN — PIPERACILLIN AND TAZOBACTAM SCH MLS/HR: 3; .375 INJECTION, POWDER, FOR SOLUTION INTRAVENOUS at 03:43

## 2021-03-17 RX ADMIN — NITROGLYCERIN ONE MCG: 10 INJECTION INTRAVENOUS at 08:17

## 2021-03-17 RX ADMIN — ASPIRIN 81 MG CHEWABLE TABLET SCH MG: 81 TABLET CHEWABLE at 05:25

## 2021-03-17 RX ADMIN — NITROGLYCERIN ONE MCG: 10 INJECTION INTRAVENOUS at 08:02

## 2021-03-17 RX ADMIN — CEFAZOLIN SCH MLS/HR: 330 INJECTION, POWDER, FOR SOLUTION INTRAMUSCULAR; INTRAVENOUS at 03:44

## 2021-03-17 RX ADMIN — METOPROLOL TARTRATE SCH MG: 25 TABLET, FILM COATED ORAL at 05:25

## 2021-03-17 RX ADMIN — NITROGLYCERIN ONE MCG: 10 INJECTION INTRAVENOUS at 07:55

## 2021-03-17 RX ADMIN — PIPERACILLIN AND TAZOBACTAM SCH MLS/HR: 3; .375 INJECTION, POWDER, FOR SOLUTION INTRAVENOUS at 20:10

## 2021-03-17 RX ADMIN — ATORVASTATIN CALCIUM SCH MG: 20 TABLET, FILM COATED ORAL at 05:25

## 2021-03-17 RX ADMIN — PIPERACILLIN AND TAZOBACTAM SCH MLS/HR: 3; .375 INJECTION, POWDER, FOR SOLUTION INTRAVENOUS at 11:54

## 2021-03-17 NOTE — CC
CARDIAC CATHETERIZATION REPORT



CARDIAC CATHETERIZATION AND PCI REPORT:



DATE OF SERVICE:

03/17/2021.



PROCEDURE:

1. Left heart catheterization and coronary angiography.

2. PTCA and stenting of a complex calcified mid LAD and major diagonal branch 
with

    drug-eluting stents.



PERFORMED BY:

Dr. SJ Churchill.



Moderate conscious sedation time was 85 minutes.  Patient was administered 
Versed.

Oxygen saturation, hemodynamics and EKG were monitored closely.



CLINICAL INFORMATION:

Mr. Jatin Larose is a 60-year-old gentleman with a known history of 
hypertension and

hyperlipidemia who [facility] [place].  He came into 
the

hospital with a chest discomfort and then went on to have significant abdominal

discomfort.  There was epigastric tenderness.  He took some Tums and Pepcid to 
feel

better without much relief.  However, he had precordial ST-T changes and 
troponin

elevation.  He had an abdominal CAT scan and also CT of the chest.  These 
studies did

not reveal anything significant.  Patient's chest pain was relieved with heparin
and

beta blockers.  Given his non ST elevation MI probably in the LAD distribution, 
I

recommended coronary angiography after due discussion regarding risks, benefits,
and

options.  There was no other family member available.  I left a message for his 
brother

who is his immediate relative.



PROCEDURE NOTE:

Under local anesthesia and strict aseptic precautions, a 6-Prydeinig introducer was
placed

in the right radial artery.  I used a JL3.5 and JR4 catheters for coronary 
angiography

and the same right catheter was used to check LV pressure, but I did not perform
LV

gram.  Following the cardiac cath procedure, I performed intervention of the LAD
and

diagonal and then the sheath was taken out and TR band applied as per protocol 
with

saturation of the fingers of the right hand of about 95%.  The patient received

Angiomax bolus and infusion as per protocol and he also received Brilinta 180 mg

orally.



CARDIAC CATHETERIZATION FINDINGS:

The left ventricular end-diastolic pressure was 7 mmHg without any gradient 
across

aortic valve.



CORONARY ANGIOGRAPHY FINDINGS:

RIGHT CORONARY ARTERY: Calcified vessel, dominant. In the proximal portion, 
there is

moderate calcification a 35% lesion.  Then the caliber improves distally 
bifurcates

into a large PDA and a small PLV, which seems to supply to the circumflex 
distribution

also.  No significant disease in the RCA other than the proximal 35% narrowing. 
The

PLV is very small, but seems to provide some circulation in the AV groove branch
as

well.  The RCA therefore is a dominant vessel with proximal 35% narrowing, 
moderate

calcification.



LEFT MAIN CORONARY ARTERY: Short patent vessel. Free of significant disease that

bifurcates into LAD and circumflex.



LEFT ANTERIOR DESCENDING CORONARY ARTERY: A heavily calcified vessel which gives
off a

small diagonal branch proximally and then there is a large diagonal branch.  
This large

diagonal branch has about a 70% to 80% narrowing,  both at the ostium and the 
proximal

portion.  After the diagonal branch, the LAD has a 95% stenosis with heavy

calcification and then gives off septal branches and runs all the way to the 
apex

supplying a sizable amount of myocardium.  LAD is therefore heavily calcified in
the

LAD as well as the diagonal and both of these have significant lesions 95% in 
LAD and

85% in diagonal branch.  Distal area appears to be free of significant disease 
and the

vessel runs all the way to the apex.



LEFT POSTERIOR CIRCUMFLEX CORONARY ARTERY: This is a nondominant circumflex, 
which is

very tortuous and in the proximal 1/3, there is a very eccentric 75% to 80% 
lesion

appears to be quite tight best seen in the JENAE cranial projection and then it 
gives off

what seems to be a good-size obtuse marginal branch and then continues in the AV
groove

and distally gives off large posterolateral branches.  The proximal circumflex

therefore has an eccentric lesion, very tortuous, calcified, and significant, 
but not

the culprit lesion.



Left ventriculogram was not performed.



FINAL IMPRESSION:

This patient has a right dominant system, a 95% mid LAD and an 85% proximal and 
mid

diagonal.  The proximal circumflex has another 80% lesion.  RCA dominant has 35%

proximal lesion.  No gradient across aortic valve.  Normal filling pressures.



RECOMMENDATIONS:

I recommended PCI of LAD and possibly diagonal and proceeded to perform this in 
the

same setting.



PCI PROCEDURE DETAILS:

A JL3.5 guide catheter was used to cannulate the left coronary artery. This was 
a 6-

Prydeinig catheter.  I used a run-through wire to cross the lesion in the LAD.

Predilatation was performed with a 2.5 caliber 20 mm long NC Trek balloon.  I 
had quite

a bit of difficulty because of extreme calcification.  Some manipulation was 
necessary

and following the predilatation I advanced and positioned a 15 mm long 3.0 
caliber

Xience stent and deployed this.  The distal portion of the lesion was very well

covered. The proximal portion had a small area which was not as well covered and
this

was immediately after the diagonal branch.  Excellent angiographic result was 
achieved

of the LAD, but the area of LAD right after the diagonal had still some 
haziness.  I

then turned my attention to the diagonal branch.  This was extremely tortuous 
and there

was heavy calcification at the origin.  I had difficulty with the balloon.  With
a 2.25

caliber NC Trek balloon I dilated the diagonal both in the ostium and the 
proximal

portion and then deployed a 15 mm long 2.25 caliber Xience stent, but a portion 
of the

distal 1/3 of the stent did not open very well.  I went back and postdilated 
that with

a 2.5 caliber NC Trek balloon at 12 atmospheres that showed modest improvement.

However, the ostium still had what seems to be a small dissection as the 
diagonal came

off the LAD.  After some deliberation and taking multiple pictures, I addressed 
this

with an 8 mm long 2.25 caliber Xience stent.  The ostium of the diagonal has a 
2.25

Xience stent and telescoped into the 15 mm long stent.  Excellent result of the

diagonal was achieved.  The stent was fully expanded except in one area where 
there was

heavy calcification.  I even went back with a 2.5 caliber 8 mm NC Trek balloon 
with

post dilatation with modest improvement.  I then noted that the area of the LAD

immediately after the diagonal was looking a bit hazy and that area was not 
covered

with the stent.  I tried to advance a _3.0___, 8 mm stent but I had difficulty 
because of

the diagonal stent jutting back.  I used a 15 mm long NC Trek balloon of 3.0 
caliber

with this I dilated across the diagonal.  I then went ahead with a 3.0 caliber 8
mm

Xience stent and deployed this right after the diagonal branch in V formation.  
The 3.0

.0stent was telescoped into the 15 mm long 3.0 stent in the LAD.  Excellent 
angiographic

result was achieved.  Patient had chest pain with LAD inflation and ST elevation
in the

anterior leads.  Excellent angiographic result was achieved without 
complication.  The

sheath was then taken out and a TR band applied as per protocol and patient was 
sent to

the room in a stable condition.



Findings were discussed with the patient, but I could not reach any family. We 
left a

message for his brother by way of a voicemail.  Overall result is excellent.





MMODL / IJN: [id number] / Job#: [id number]

MOE

## 2021-03-17 NOTE — ECHOF
Referral Reason:NSTEMI



MEASUREMENTS

--------

HEIGHT: 172.7 cm

WEIGHT: 71.2 kg

BP: 

IVSd:   1.0 cm     (0.6 - 1.1)

LVIDd:   3.4 cm     (3.9 - 5.3)

LVPWd:   1.1 cm     (0.6 - 1.1)

IVSs:   1.5 cm

LVIDs:   2.0 cm

LVPWs:   1.2 cm

LAESV Index (A-L):   16.77 ml/m

Ao Diam:   3.0 cm     (2.0 - 3.7)

AV Cusp:   1.8 cm     (1.5 - 2.6)

LA Diam:   2.8 cm     (2.7 - 3.8)

MV EXCURSION:   11.800 mm     (> 18.000)

MV EF SLOPE:   95 mm/s     (70 - 150)

EPSS:   0.9 cm

MV E Danyel:   0.67 m/s

MV DecT:   217 ms

MV A Danyel:   0.83 m/s

MV E/A Ratio:   0.80 

RAP:   5.00 mmHg

RVSP:   12.32 mmHg







FINDINGS

--------

This was a technically difficult study with suboptimal views.

The left ventricular size is normal.   Left ventricular wall thickness is normal.   Overall left vent
ricular systolic function is mildly impaired with, an EF between 45 - 50 %.   The diastolic filling p
attern is normal for the age of the patient 8.04.   Basal inferoseptal LV wall motion is hypokinetic.
    Apical septum LV wall motion is hypokinetic.

The right ventricle is normal in size.

The left atrial size is normal.    Normal LA  size by volume 22+/-6 ml/m2.

The right atrial size is normal.

The aortic valve is trileaflet and appears structurally normal.

The mitral valve is normal.   There is trace mitral regurgitation.

The tricuspid valve appears structurally normal.   Trace tricuspid regurgitation present.   Right facundo
tricular systolic pressure is normal at < 35 mmHg.

There is no pulmonic regurgitation present.

The aortic root size is normal.

IVC Not well visulized.

There is a trivial pericardial effusion present.

Lumason used



CONCLUSIONS

--------

1. The left ventricular size is normal.

2. Left ventricular wall thickness is normal.

3. Overall left ventricular systolic function is mildly impaired with, an EF between 45 - 50 %.

4. The diastolic filling pattern is normal for the age of the patient 8.04

5. Basal inferoseptal LV wall motion is hypokinetic.

6. Apical septum LV wall motion is hypokinetic.

7. There is trace mitral regurgitation.

8. Trace tricuspid regurgitation present.

9. There is a trivial pericardial effusion present.





SONOGRAPHER: Janis Martin RDCS

## 2021-03-18 LAB
ANION GAP SERPL CALC-SCNC: 9 MMOL/L
BASOPHILS # BLD AUTO: 0 K/UL (ref 0–0.2)
BASOPHILS NFR BLD AUTO: 0 %
BUN SERPL-SCNC: 16 MG/DL (ref 9–20)
CALCIUM SPEC-MCNC: 8.8 MG/DL (ref 8.4–10.2)
CHLORIDE SERPL-SCNC: 105 MMOL/L (ref 98–107)
CO2 SERPL-SCNC: 21 MMOL/L (ref 22–30)
EOSINOPHIL # BLD AUTO: 0.1 K/UL (ref 0–0.7)
EOSINOPHIL NFR BLD AUTO: 1 %
ERYTHROCYTE [DISTWIDTH] IN BLOOD BY AUTOMATED COUNT: 4.24 M/UL (ref 4.3–5.9)
ERYTHROCYTE [DISTWIDTH] IN BLOOD: 12.7 % (ref 11.5–15.5)
GLUCOSE SERPL-MCNC: 111 MG/DL (ref 74–99)
HCT VFR BLD AUTO: 38.1 % (ref 39–53)
HGB BLD-MCNC: 13.2 GM/DL (ref 13–17.5)
INR PPP: 0.9 (ref ?–1.2)
LYMPHOCYTES # SPEC AUTO: 1.3 K/UL (ref 1–4.8)
LYMPHOCYTES NFR SPEC AUTO: 15 %
MCH RBC QN AUTO: 31.1 PG (ref 25–35)
MCHC RBC AUTO-ENTMCNC: 34.5 G/DL (ref 31–37)
MCV RBC AUTO: 90 FL (ref 80–100)
MONOCYTES # BLD AUTO: 0.6 K/UL (ref 0–1)
MONOCYTES NFR BLD AUTO: 7 %
NEUTROPHILS # BLD AUTO: 6.5 K/UL (ref 1.3–7.7)
NEUTROPHILS NFR BLD AUTO: 77 %
PLATELET # BLD AUTO: 184 K/UL (ref 150–450)
POTASSIUM SERPL-SCNC: 3.6 MMOL/L (ref 3.5–5.1)
PT BLD: 10.2 SEC (ref 9–12)
SODIUM SERPL-SCNC: 135 MMOL/L (ref 137–145)
WBC # BLD AUTO: 8.5 K/UL (ref 3.8–10.6)

## 2021-03-18 RX ADMIN — ATORVASTATIN CALCIUM SCH MG: 80 TABLET, FILM COATED ORAL at 08:54

## 2021-03-18 RX ADMIN — PIPERACILLIN AND TAZOBACTAM SCH MLS/HR: 3; .375 INJECTION, POWDER, FOR SOLUTION INTRAVENOUS at 12:18

## 2021-03-18 RX ADMIN — ASPIRIN 81 MG CHEWABLE TABLET SCH MG: 81 TABLET CHEWABLE at 08:54

## 2021-03-18 RX ADMIN — METOPROLOL TARTRATE SCH MG: 25 TABLET, FILM COATED ORAL at 20:53

## 2021-03-18 RX ADMIN — PIPERACILLIN AND TAZOBACTAM SCH MLS/HR: 3; .375 INJECTION, POWDER, FOR SOLUTION INTRAVENOUS at 20:53

## 2021-03-18 RX ADMIN — PIPERACILLIN AND TAZOBACTAM SCH MLS/HR: 3; .375 INJECTION, POWDER, FOR SOLUTION INTRAVENOUS at 05:43

## 2021-03-18 RX ADMIN — METOPROLOL TARTRATE SCH MG: 50 TABLET, FILM COATED ORAL at 08:54

## 2021-03-18 RX ADMIN — PANTOPRAZOLE SODIUM SCH MG: 40 TABLET, DELAYED RELEASE ORAL at 08:54

## 2021-03-18 NOTE — PN
PROGRESS NOTE



DATE OF SERVICE:

03/18/2021



REASON FOR FOLLOWUP:

Fever and leukocytosis.



INTERVAL HISTORY:

The patient is currently afebrile. Patient is breathing comfortably. The patient denies

having any chest pain, no shortness of breath or cough. No nausea, no vomiting. No

abdominal pain, no diarrhea.



PHYSICAL EXAMINATION:

Blood pressure 122/74, pulse of 74, temperature 98.7. He is 97% on room air.

General description is a middle-aged male lying in bed in no distress.

RESPIRATORY SYSTEM: Unlabored breathing clear to auscultation anteriorly.

HEART: S1, S2.  Regular rate and rhythm.

ABDOMEN: Soft, no tenderness.



LABS:

White count normalized to 8.5. Creatinine is 1.34.  CRP 26.7.  Blood culture negative.



DIAGNOSTIC IMPRESSION AND PLAN:

Patient admitted to the hospital with epigastric pain in this patient who did have a

fever, elevated white count.  Patient did have a myocardial infarction, status post

PTCA and stenting to the LAD; however, no obvious focus for his fever. Patient did have

CT chest, abdomen and pelvis, did not show any abnormality and his urine was negative.

We did ask for a WBC scan, will keep the patient on Zosyn while waiting for the _____to

be finalized. Continue supportive care.





MMODL / IJN: [id number] / Job#: [id number]

## 2021-03-18 NOTE — CONS
CONSULTATION



DATE OF SERVICE:

03/17/2021



REASON FOR CONSULTATION:

Sepsis, unknown source.



HISTORY OF PRESENT ILLNESS:

The patient is a 60-year-old  male presenting to the ER yesterday morning for

evaluation of epigastric pain.  The patient's symptoms have been going on for a day or

two before presentation to the hospital.  The pain has been mostly dull aching to sharp

4-5/10 and no radiation with associated nausea but no vomiting.  The patient denies

having any chest pain, shortness of breath or cough.  No diarrhea and no urinary

symptoms.  Patient on presentation to the hospital did have a fever of 101 degrees

Fahrenheit with low-grade fever of 99.8 early this morning. The patient has been

afebrile since then.  The patient is currently 96% on room air.  The patient did have

white count 13.1 on admission down to 10.8 today. Kidney function was normal.  The

patient did have elevated troponin.  Liver enzymes are normal.  Urine has been

negative.  Rodriguez PCR was negative. The patient did have a chest x-ray that did not

show any acute infiltrate.  CT of the chest did not show any pneumonia.  A CT of

abdomen and pelvis was negative as well.  The patient is status post cardiac cath and

stenting of the LAD. In this patient who has been empirically treated with Zosyn,

Infectious Disease was consulted today for concern for sepsis with unknown source.  The

patient overall is feeling better since admission to the hospital.



REVIEW OF SYSTEMS:

Positive points have been mentioned in HPI.  Rest of systems are negative.



PAST MEDICAL HISTORY:

Significant for hypertension, hyperlipidemia, gastroesophageal reflux disease.



PAST SURGERY HISTORY:

No major surgery.



SOCIAL HISTORY:

Denies smoking, drinking or any drug use.



FAMILY HISTORY:

No pertinent findings noticed.



ALLERGIES:

CINNAMON.



MEDICATIONS:

The patient is currently on aspirin, Lipitor, heparin, Zestril, Lopressor, Protonix,

Zosyn, Brilinta.



PHYSICAL EXAMINATION:

VITAL SIGNS: Blood pressure 123/80 with a pulse of 96, temperature 98.5, T-max 101, he

is 96% on room air.

GENERAL DESCRIPTION: Patient is a middle-aged male lying in bed in no distress.  No

tachypnea or accessory muscles of respiration use.

HEENT:  Examination shows no pallor or scleral icterus.  Oral mucous membrane is dry.

No pharyngeal erythema or thrush.

NECK: Trachea central, no thyromegaly.

LUNGS: Unlabored breathing, clear to auscultation. No wheeze or crackle.

HEART: S1-S2, regular rate and rhythm.

ABDOMEN:  Soft, no tenderness. No guarding or rigidity. No organomegaly.

EXTREMITIES: No edema of the feet.

SKIN: No rash or mass palpable.

NEUROLOGICAL: Patient is awake, alert, oriented times three.  Mood and affect normal.



LABS:

Blood culture has been negative so far.  Urine is negative. White count down 10.8 and

admission white count was 13.1 with left shift. Liver enzymes are normal. Urine is

negative.



DIAGNOSTIC IMPRESSION:

Patient presented to hospital mostly with epigastric pain in this patient with

diagnosis of acute myocardial infarction and is status post stenting of the left

anterior descending. However, the patient did have elevated white count and fever with

no obvious focus of infection.  The patient did have a CT of the chest, abdomen and

pelvis that did not show any acute abnormality and urine has been negative.  No

evidence of any cellulitis or joint swelling.



PLAN:

1. Keep the patient on Zosyn in this patient with fever while we are waiting for the

    cultures to finalize.

2. We will obtain a WBC scan and further workup on the basis of that scan.

3. We will follow on clinical condition to further adjust medication if needed. Thank

    you for this consultation.  Will follow this patient along with you.





MMODL / IJN: [id number] / Job#: [id number]

## 2021-03-18 NOTE — NM
EXAMINATION TYPE: NM WBC whole body

 

DATE OF EXAM: 3/18/2021

 

COMPARISON: NONE

 

HISTORY: Fever

 

TECHNIQUE:  Following administration of 12.11 mCi Tc99m Ceretec.  Images obtained 4 hours post inject
ion.

 

FINDINGS:

Normal physiological tracer activity is noted in the liver and spleen and in the bone marrow of the a
xial and appendicular skeleton.

 

 

IMPRESSION:

Normal white blood cell scan. No evidence for abnormal tracer activity. Exam fails to demonstrate trevor
dence of pyogenic infection.

## 2021-03-19 VITALS
TEMPERATURE: 98.4 F | RESPIRATION RATE: 16 BRPM | HEART RATE: 68 BPM | DIASTOLIC BLOOD PRESSURE: 70 MMHG | SYSTOLIC BLOOD PRESSURE: 109 MMHG

## 2021-03-19 LAB
BASOPHILS # BLD AUTO: 0 K/UL (ref 0–0.2)
BASOPHILS NFR BLD AUTO: 0 %
EOSINOPHIL # BLD AUTO: 0.2 K/UL (ref 0–0.7)
EOSINOPHIL NFR BLD AUTO: 2 %
ERYTHROCYTE [DISTWIDTH] IN BLOOD BY AUTOMATED COUNT: 4.34 M/UL (ref 4.3–5.9)
ERYTHROCYTE [DISTWIDTH] IN BLOOD: 12.2 % (ref 11.5–15.5)
HCT VFR BLD AUTO: 39.6 % (ref 39–53)
HGB BLD-MCNC: 13.5 GM/DL (ref 13–17.5)
INR PPP: 1 (ref ?–1.2)
LYMPHOCYTES # SPEC AUTO: 1.4 K/UL (ref 1–4.8)
LYMPHOCYTES NFR SPEC AUTO: 17 %
MCH RBC QN AUTO: 31.1 PG (ref 25–35)
MCHC RBC AUTO-ENTMCNC: 34.1 G/DL (ref 31–37)
MCV RBC AUTO: 91.1 FL (ref 80–100)
MONOCYTES # BLD AUTO: 0.6 K/UL (ref 0–1)
MONOCYTES NFR BLD AUTO: 7 %
NEUTROPHILS # BLD AUTO: 6.3 K/UL (ref 1.3–7.7)
NEUTROPHILS NFR BLD AUTO: 73 %
PLATELET # BLD AUTO: 204 K/UL (ref 150–450)
PT BLD: 10.3 SEC (ref 9–12)
WBC # BLD AUTO: 8.6 K/UL (ref 3.8–10.6)

## 2021-03-19 RX ADMIN — PIPERACILLIN AND TAZOBACTAM SCH MLS/HR: 3; .375 INJECTION, POWDER, FOR SOLUTION INTRAVENOUS at 04:11

## 2021-03-19 RX ADMIN — ATORVASTATIN CALCIUM SCH MG: 80 TABLET, FILM COATED ORAL at 08:39

## 2021-03-19 RX ADMIN — ASPIRIN 81 MG CHEWABLE TABLET SCH MG: 81 TABLET CHEWABLE at 08:39

## 2021-03-19 RX ADMIN — PIPERACILLIN AND TAZOBACTAM SCH MLS/HR: 3; .375 INJECTION, POWDER, FOR SOLUTION INTRAVENOUS at 11:38

## 2021-03-19 RX ADMIN — METOPROLOL TARTRATE SCH MG: 50 TABLET, FILM COATED ORAL at 08:39

## 2021-03-19 RX ADMIN — PANTOPRAZOLE SODIUM SCH MG: 40 TABLET, DELAYED RELEASE ORAL at 08:39

## 2021-03-19 NOTE — PN
PROGRESS NOTE



DATE OF SERVICE:

03/19/2021



REASON FOR FOLLOWUP:

Fever and elevated white count.



INTERVAL HISTORY:

The patient was seen on rounds early this afternoon.  The patient has been afebrile.

The patient is feeling better.  He is breathing comfortably.  Denies having any chest

pain, shortness of breath or cough.  No abdominal pain or diarrhea.



PHYSICAL EXAMINATION:

Blood pressure 109/70 with a pulse of 68, temperature 98.4.  He is 97% on room air.

General description is a middle-aged male lying in bed in no distress.

RESPIRATORY SYSTEM: Unlabored breathing, clear to auscultation.

HEART: S1, S2.  Regular rate and rhythm.

ABDOMEN:  Soft, no tenderness.



LABS:

Hemoglobin 13.5, white count 8.6.  Blood culture has been negative.  WBC scan came back

negative as well.



DIAGNOSTIC IMPRESSION AND PLAN:

Patient with a fever, elevated white count in this patient presented to the hospital

with acute myocardial infarction, status post PTCA and stenting to the LAD.  The

patient's fever resolved, white count normalized and no focus of infection.  CT chest,

abdomen and pelvis did not show any evidence of infection.  Culture negative. May

consider a short course of oral Augmentin on discharge and a close outpatient followup.

All his questions and concerns were answered.





MMODL / IJN: [id number] / Job#: [id number]

## 2021-03-19 NOTE — PN
PROGRESS NOTE



This is a 60-year-old gentleman who I performed stenting of mid LAD and major diagonal

branch with a drug-eluting stents on 03/17/2021.  Yesterday, he was supposed to be

discharged, but because of a fever of unclear etiology, workup is in progress.  He is

doing well, asymptomatic. Right radial site is clean and dry.



Vitals are stable. No JVD. S1, S2 heard normally, short systolic murmur noted. Lungs

are clear. Abdomen and lower extremity exam unchanged.



Cardiac-wise, he is stable.  He does have a circumflex lesion which will be addressed

at a later date.  If he is cleared by his infectious disease doctor, he can be

discharged and I will see him in the office in 7-10 days.



Discharge instructions regarding activity, diet and medications were given.





MMODL / IJN: [id number] / Job#: [id number]

## 2021-03-21 ENCOUNTER — HOSPITAL ENCOUNTER (EMERGENCY)
Dept: HOSPITAL 47 - EC | Age: 61
Discharge: HOME | End: 2021-03-21
Payer: OTHER GOVERNMENT

## 2021-03-21 VITALS
TEMPERATURE: 98.4 F | RESPIRATION RATE: 18 BRPM | SYSTOLIC BLOOD PRESSURE: 132 MMHG | HEART RATE: 80 BPM | DIASTOLIC BLOOD PRESSURE: 92 MMHG

## 2021-03-21 DIAGNOSIS — I10: ICD-10-CM

## 2021-03-21 DIAGNOSIS — E78.5: ICD-10-CM

## 2021-03-21 DIAGNOSIS — M79.605: Primary | ICD-10-CM

## 2021-03-21 DIAGNOSIS — K21.9: ICD-10-CM

## 2021-03-21 PROCEDURE — 99283 EMERGENCY DEPT VISIT LOW MDM: CPT

## 2021-03-21 NOTE — ED
Extremity Problem HPI





- General


Chief complaint: Extremity Problem,Nontraumatic


Stated complaint: leg cramping


Time Seen by Provider: 03/21/21 04:29


Source: patient


Mode of arrival: ambulatory


Limitations: no limitations





- Related Data


                                Home Medications











 Medication  Instructions  Recorded  Confirmed


 


Alpha-D-Galactosidase [Beano] 300 unit PO AC-TID PRN 03/16/21 03/16/21


 


Cholecalciferol [Vitamin D3 (25 25 mcg PO DAILY 03/16/21 03/16/21





Mcg = 1000 Iu)]   


 


Omeprazole [PriLOSEC] 40 mg PO DAILY 03/16/21 03/16/21








                                  Previous Rx's











 Medication  Instructions  Recorded


 


Aspirin 81 mg PO DAILY  chew 03/18/21


 


Atorvastatin [Lipitor] 80 mg PO DAILY #90 tab 03/18/21


 


Clopidogrel [Plavix] 75 mg PO DAILY #90 tab 03/18/21


 


Metoprolol Tartrate [Lopressor] 25 mg PO HS  tab 03/18/21


 


Metoprolol Tartrate [Lopressor] 50 mg PO DAILY #180 tab 03/18/21


 


Nitroglycerin Sl Tabs [Nitrostat] 0.4 mg SUBLINGUAL Q5M PRN #1 bottle 03/18/21


 


lisinopriL [Zestril] 20 mg PO DAILY #90 tab 03/18/21


 


Amoxic-Pot Clav 875-125Mg 1 tab PO Q12HR 7 Days #14 tab 03/19/21





[Augmentin 875-125]  











                                    Allergies











Allergy/AdvReac Type Severity Reaction Status Date / Time


 


cinnamon Allergy  Rash/Hives Verified 03/21/21 04:02














Review of Systems


ROS Statement: 


Those systems with pertinent positive or pertinent negative responses have been 

documented in the HPI.





ROS Other: All systems not noted in ROS Statement are negative.





Past Medical History


Past Medical History: GERD/Reflux, Hyperlipidemia, Hypertension


Additional Past Medical History / Comment(s): HAIRLINE FRACTURES TO BILATERAL 

HIPS, SPINAL CORD PROBLEMS/ CHIP FX


History of Any Multi-Drug Resistant Organisms: None Reported


Past Surgical History: No Surgical Hx Reported


Additional Past Surgical History / Comment(s): hemorroid


Past Psychological History: No Psychological Hx Reported


Smoking Status: Never smoker


Past Alcohol Use History: None Reported


Past Drug Use History: None Reported





General Exam


Limitations: no limitations





Course





                                   Vital Signs











  03/21/21





  04:02


 


Temperature 98.4 F


 


Pulse Rate 80


 


Respiratory 18





Rate 


 


Blood Pressure 132/92


 


O2 Sat by Pulse 99





Oximetry 














Disposition


Clinical Impression: 


 Left leg pain





Disposition: HOME SELF-CARE


Condition: Good


Instructions (If sedation given, give patient instructions):  Leg Pain (ED)


Is patient prescribed a controlled substance at d/c from ED?: No


Referrals: 


[facility],Clinic [Primary Care Provider] - 1-2 days

## 2021-05-05 ENCOUNTER — HOSPITAL ENCOUNTER (OUTPATIENT)
Dept: HOSPITAL 47 - CATHCVL | Age: 61
Discharge: HOME | End: 2021-05-05
Attending: INTERNAL MEDICINE
Payer: OTHER GOVERNMENT

## 2021-05-05 VITALS — DIASTOLIC BLOOD PRESSURE: 84 MMHG | SYSTOLIC BLOOD PRESSURE: 133 MMHG | HEART RATE: 58 BPM

## 2021-05-05 VITALS — TEMPERATURE: 98.2 F | RESPIRATION RATE: 16 BRPM

## 2021-05-05 VITALS — BODY MASS INDEX: 24.3 KG/M2

## 2021-05-05 DIAGNOSIS — I25.84: ICD-10-CM

## 2021-05-05 DIAGNOSIS — Z95.5: ICD-10-CM

## 2021-05-05 DIAGNOSIS — I77.1: ICD-10-CM

## 2021-05-05 DIAGNOSIS — E78.5: ICD-10-CM

## 2021-05-05 DIAGNOSIS — I12.9: ICD-10-CM

## 2021-05-05 DIAGNOSIS — Z79.02: ICD-10-CM

## 2021-05-05 DIAGNOSIS — I25.2: ICD-10-CM

## 2021-05-05 DIAGNOSIS — E78.00: ICD-10-CM

## 2021-05-05 DIAGNOSIS — I25.10: Primary | ICD-10-CM

## 2021-05-05 DIAGNOSIS — Z79.82: ICD-10-CM

## 2021-05-05 DIAGNOSIS — N18.9: ICD-10-CM

## 2021-05-05 DIAGNOSIS — Z82.49: ICD-10-CM

## 2021-05-05 DIAGNOSIS — Z79.899: ICD-10-CM

## 2021-05-05 LAB
ANION GAP SERPL CALC-SCNC: 7 MMOL/L
BASOPHILS # BLD AUTO: 0.1 K/UL (ref 0–0.2)
BASOPHILS NFR BLD AUTO: 1 %
BUN SERPL-SCNC: 16 MG/DL (ref 9–20)
CALCIUM SPEC-MCNC: 9 MG/DL (ref 8.4–10.2)
CHLORIDE SERPL-SCNC: 104 MMOL/L (ref 98–107)
CO2 SERPL-SCNC: 30 MMOL/L (ref 22–30)
EOSINOPHIL # BLD AUTO: 0.6 K/UL (ref 0–0.7)
EOSINOPHIL NFR BLD AUTO: 8 %
ERYTHROCYTE [DISTWIDTH] IN BLOOD BY AUTOMATED COUNT: 4.46 M/UL (ref 4.3–5.9)
ERYTHROCYTE [DISTWIDTH] IN BLOOD: 12.5 % (ref 11.5–15.5)
GLUCOSE SERPL-MCNC: 111 MG/DL (ref 74–99)
HCT VFR BLD AUTO: 40.5 % (ref 39–53)
HGB BLD-MCNC: 13.9 GM/DL (ref 13–17.5)
LYMPHOCYTES # SPEC AUTO: 1.9 K/UL (ref 1–4.8)
LYMPHOCYTES NFR SPEC AUTO: 25 %
MCH RBC QN AUTO: 31.1 PG (ref 25–35)
MCHC RBC AUTO-ENTMCNC: 34.3 G/DL (ref 31–37)
MCV RBC AUTO: 90.7 FL (ref 80–100)
MONOCYTES # BLD AUTO: 0.4 K/UL (ref 0–1)
MONOCYTES NFR BLD AUTO: 6 %
NEUTROPHILS # BLD AUTO: 4.7 K/UL (ref 1.3–7.7)
NEUTROPHILS NFR BLD AUTO: 60 %
PLATELET # BLD AUTO: 249 K/UL (ref 150–450)
POTASSIUM SERPL-SCNC: 4.3 MMOL/L (ref 3.5–5.1)
SODIUM SERPL-SCNC: 141 MMOL/L (ref 137–145)
WBC # BLD AUTO: 7.8 K/UL (ref 3.8–10.6)

## 2021-05-05 PROCEDURE — 85025 COMPLETE CBC W/AUTO DIFF WBC: CPT

## 2021-05-05 PROCEDURE — 87635 SARS-COV-2 COVID-19 AMP PRB: CPT

## 2021-05-05 PROCEDURE — 80048 BASIC METABOLIC PNL TOTAL CA: CPT

## 2021-05-05 RX ADMIN — HEPARIN SODIUM ONE UNIT: 1000 INJECTION, SOLUTION INTRAVENOUS; SUBCUTANEOUS at 10:00

## 2021-05-05 RX ADMIN — HEPARIN SODIUM ONE UNIT: 1000 INJECTION, SOLUTION INTRAVENOUS; SUBCUTANEOUS at 10:16

## 2021-05-05 NOTE — PTCA
PERCUTANEOUSTRANS CORORONARY ANGIOGRAPHY



DATE OF SERVICE:

05/05/2021.



PROCEDURE:

1. Left coronary angiogram to assess patency of LAD and diagonal that was stented in

    March 2021.

2. PTCA and stenting of proximal circumflex with 2 drug-eluting stents.



PERFORMED BY:

Dr. SJ Churchill.



Moderate conscious sedation time was 50 minutes.  The patient was administered Versed.

Oxygen saturation, hemodynamics and EKG were monitored closely.



CLINICAL INFORMATION:

Mr. Jatin Larose is a 61-year-old gentleman with history of hypertension,

hyperlipidemia, and chronic kidney disease with a creatinine of about 1.5 or so.  He

presented with a non-ST elevation MI underwent stenting of LAD and diagonal on March 17, 2021 with an excellent result.  He was advised to come back in for staged

intervention of circumflex which had 80% long proximal lesion, tortuous, calcified and

somewhat difficult technically.  He was advised that we will check patency of LAD and

diagonal and performed PCI of circumflex.  Risks, benefits, options, rationale were

explained.  The patient understood all details and wished to proceed with the

procedure.



PROCEDURE NOTE:

Under local anesthesia and strict aseptic precautions, a 6-Bengali introducer was placed

in the right radial artery.  Initially I used a JL3.5 guide catheter. With this I did

selective coronary angiography and noted that the LAD and diagonal were widely patent.

I advanced the wire into the circumflex and pre-dilated the lesion with a 2.5 caliber

15 mm long NC Trek balloon.  After that, I tried to advance 8 mm long 3.25 caliber

Xience stent, but I had considerable difficulty because of tortuosity of the circumflex

as it came down from the left main and also difficult guide support.  I also tried to

use a GuideLiner, but even with this I had difficulty and the catheter and the wire

came out.  I then switched over to an XB left guide catheter of 4.0 curved with this

and the same run-through wire I advanced a 3.25 caliber 8 mm Xience stent carefully

positioned at the distal end of the lesion just before bifurcation and deployed it at

13 atmospheres.  Subsequently a 15 mm long Xience stent was deployed proximal to it and

deployed at 13 atmospheres.  Patient had chest pain, but no significant EKG changes.

Excellent angiographic result was achieved.  The sheath was taken out and a TR band

applied as per protocol and saturation of the fingers of the right hand was 95%.  The

patient received additional 225 mg of Plavix.  He was already on aspirin and Plavix.

He received heparin intravenously of 6000 units and his ACT was about 262.  The goal

was to keep the ACT between 250 and 300.  The sheath was taken out and the patient was

sent to the room in stable condition.  Results were discussed with the patient and

family, specifically his sister.  He will be discharged later this evening at 6 p.m.

and I will see him in the office next week.



Discharge instructions were given.  Excellent angiographic result was achieved without

complication.





MMODL / IJN: [id number] / Job#: [id number]

## 2025-07-22 ENCOUNTER — HOSPITAL ENCOUNTER (OUTPATIENT)
Dept: HOSPITAL 47 - RADUSWWP | Age: 65
Discharge: HOME | End: 2025-07-22
Attending: NURSE PRACTITIONER
Payer: OTHER GOVERNMENT

## 2025-07-22 DIAGNOSIS — R94.4: Primary | ICD-10-CM

## 2025-07-22 PROCEDURE — 76770 US EXAM ABDO BACK WALL COMP: CPT
